# Patient Record
Sex: MALE | Race: WHITE | Employment: FULL TIME | URBAN - METROPOLITAN AREA
[De-identification: names, ages, dates, MRNs, and addresses within clinical notes are randomized per-mention and may not be internally consistent; named-entity substitution may affect disease eponyms.]

---

## 2017-03-20 PROBLEM — N52.9 ERECTILE DYSFUNCTION: Status: ACTIVE | Noted: 2017-03-20

## 2018-01-09 PROBLEM — F33.9 RECURRENT DEPRESSION (HCC): Status: ACTIVE | Noted: 2018-01-09

## 2018-03-21 PROBLEM — S46.111A STRAIN OF MUSCLE, FASCIA AND TENDON OF LONG HEAD OF BICEPS, RIGHT ARM, INITIAL ENCOUNTER: Status: ACTIVE | Noted: 2018-03-21

## 2018-03-21 PROBLEM — S43.431A SUPERIOR GLENOID LABRUM LESION OF RIGHT SHOULDER: Status: ACTIVE | Noted: 2018-03-21

## 2018-03-21 PROBLEM — S46.011A TRAUMATIC TEAR OF RIGHT ROTATOR CUFF: Status: ACTIVE | Noted: 2018-03-21

## 2018-03-21 NOTE — H&P
[facility] HISTORY AND PHYSICAL    Subjective:     Patient is a 65 y.o. RHD MALE WITH RIGHT SHOULDER PAIN. SEE OFFICE NOTE. Patient Active Problem List    Diagnosis Date Noted    Traumatic tear of right rotator cuff 03/21/2018    Superior glenoid labrum lesion of right shoulder 03/21/2018    Strain of muscle, fascia and tendon of long head of biceps, right arm, initial encounter 03/21/2018    Recurrent depression ([address] 01/09/2018    Erectile dysfunction 03/20/2017    Dysosmia 11/02/2015     Past Medical History:   Diagnosis Date    Depression     Elevated PSA       Past Surgical History:   Procedure Laterality Date    HX COLONOSCOPY  11/2015    + polyp, repeat 2018    HX ROTATOR CUFF REPAIR      HX WISDOM TEETH EXTRACTION        Prior to Admission medications    Medication Sig Start Date End Date Taking? Authorizing Provider   sertraline (ZOLOFT) 100 mg tablet Take 1 Tab by mouth daily. 1/31/18   Caridad Valero MD   diclofenac EC (VOLTAREN) 75 mg EC tablet 1 bid for pain 1/9/18   Caridad Valero MD   tadalafil (CIALIS) 20 mg tablet 1/2 to 1  q48 h prn 11/10/17   Kait Molina MD     Allergies   Allergen Reactions    Codeine Other (comments)     Elevated BP    Phenylephrine Other (comments)     Elevated BP      Social History   Substance Use Topics    Smoking status: Never Smoker    Smokeless tobacco: Never Used    Alcohol use Not on file      Family History   Problem Relation Age of Onset    Heart Disease Father       Review of Systems  A comprehensive review of systems was negative except for that written in the HPI. Objective:     No data found. There were no vitals taken for this visit. General:  Alert, cooperative, no distress, appears stated age. Head:  Normocephalic, without obvious abnormality, atraumatic. Back:   Symmetric, no curvature. ROM normal. No CVA tenderness. Lungs:   Clear to auscultation bilaterally.    Chest wall: No tenderness or deformity. Heart:  Regular rate and rhythm, S1, S2 normal, no murmur, click, rub or gallop. Extremities: Extremities normal, atraumatic, no cyanosis or edema. Pulses: 2+ and symmetric all extremities. Skin: Skin color, texture, turgor normal. No rashes or lesions   Lymph nodes: Cervical, supraclavicular, and axillary nodes normal.   Neurologic: CNII-XII intact. Normal strength, sensation and reflexes throughout. Assessment:     Principal Problem:    Traumatic tear of right rotator cuff (3/21/2018)    Active Problems:    Superior glenoid labrum lesion of right shoulder (3/21/2018)      Strain of muscle, fascia and tendon of long head of biceps, right arm, initial encounter (3/21/2018)        Plan:     The various methods of treatment have been discussed with the patient and family. PATIENT HAS EXHAUSTED NON-OPERATIVE MODALITIES     After consideration of risks, benefits and other options for treatment, the patient has consented to surgical intervention.     SEE OFFICE NOTE    Vonda Crandall MD

## 2018-03-29 ENCOUNTER — HOSPITAL ENCOUNTER (OUTPATIENT)
Dept: SURGERY | Age: 58
Discharge: HOME | End: 2018-03-29

## 2018-03-30 VITALS — HEIGHT: 67 IN | WEIGHT: 172 LBS | BODY MASS INDEX: 27 KG/M2

## 2018-03-30 RX ORDER — GLUCOSAMINE/CHONDR SU A SOD 750-600 MG
1 TABLET ORAL DAILY
COMMUNITY

## 2018-03-30 RX ORDER — SAW PALMETTO 160 MG
1 CAPSULE ORAL DAILY
COMMUNITY

## 2018-03-30 NOTE — PERIOP NOTES
Patient verified name, , and surgery as listed in [facility]. Type 1B surgery, PAT phone assessment complete. Orders were received. Labs per surgeon: HgbA1C and FBS DOS. Labs per anesthesia protocol: none. Patient answered medical/surgical history questions at their best of ability. All prior to admission medications documented in [facility]. Patient instructed to take the following medications the day of surgery according to anesthesia guidelines with a small sip of water: zoloft. Hold all vitamins 7 days prior to surgery and NSAIDS 5 days prior to surgery. Medications to be held -vitamins and supplements. Patient instructed on the following:  Arrive at A Entrance, time of arrival to be called the day before by 1700  NPO after midnight including gum, mints, and ice chips  Responsible adult must drive patient to the hospital, stay during surgery, and patient will  need supervision 24 hours after anesthesia  Use antibacterial soap in shower the night before surgery and on the morning of surgery  Leave all valuables (money and jewelry) at home but bring insurance card and ID on  DOS  Do not wear make-up, nail polish, lotions, cologne, perfumes, powders, or oil on skin. Patient teach back successful and patient demonstrates knowledge of instruction.

## 2018-04-02 NOTE — BRIEF OP NOTE
BRIEF OPERATIVE NOTE    Date of Procedure: 4/5/2018     Preoperative Diagnosis:  ROTATOR CUFF TEAR RIGHT SHOULDER      BICEPITAL STRAIN RIGHT SHOULDER      SLAP TEAR RIGHT SHOULDER      AC OA RIGHT SHOULDER    Postoperative Diagnosis:  SAME    Procedure(s): ARTHROSCOPY RIGHT SHOULDER REVISION ARTHROSCOPIC SUBACROMIAL DECOMPRESSION, REVISION DISTAL CLAVICLE RESECTION, EXTENSIVE DEBRIDEMENT SLAP TEAR, GLENOHUMERAL JOINT, SUBACROMIAL SPACE, MINI OPEN ROTATOR CUFF REPAIR, BICEPS TENODESIS    Surgeon(s) and Role:     * Ricky Carrillo MD - Primary         Assistant Staff: None      Surgical Staff:  * No surgical staff found *  No case tracking events are documented in the log. Anesthesia:  GENERAL WITH INTERSCALENE BLOCK    Estimated Blood Loss: 10 CC. Complications: NONE    Implants:     Implant Name Type Inv.  Item Serial No.  Lot No. LRB No. Used Action   ANCHOR SUT 5.5MM W/NDL PEEK ZP --  - [id number]  ANCHOR SUT 5.5MM W/NDL PEEK ZP --  [id number] [employer] [id number] Right 2 Implanted   ANCHOR SUT 5.5MM W/NDL PEEK ZP --  - [id number]  ANCHOR SUT 5.5MM W/NDL PEEK ZP --  [id number] [employer] [id number] Right 1 Implanted   ANCHOR SUT 5.5MM W/NDL PEEK ZP --  - [id number]  ANCHOR SUT 5.5MM W/NDL PEEK ZP --  [id number] [employer] [id number] Right 2 Implanted   ANCHOR SUT 2.3MM W/2.0MM BRAID --  - [id number]   ANCHOR SUT 2.3MM W/2.0MM BRAID --  [id number] [employer] [id number] Right 3 Implanted       Ricky Carrillo MD

## 2018-04-04 ENCOUNTER — ANESTHESIA EVENT (OUTPATIENT)
Dept: SURGERY | Age: 58
End: 2018-04-04
Payer: COMMERCIAL

## 2018-04-04 RX ORDER — MIDAZOLAM HYDROCHLORIDE 1 MG/ML
2 INJECTION, SOLUTION INTRAMUSCULAR; INTRAVENOUS ONCE
Status: CANCELLED | OUTPATIENT
Start: 2018-04-04 | End: 2018-04-04

## 2018-04-04 RX ORDER — SODIUM CHLORIDE 0.9 % (FLUSH) 0.9 %
5-10 SYRINGE (ML) INJECTION EVERY 8 HOURS
Status: CANCELLED | OUTPATIENT
Start: 2018-04-04

## 2018-04-04 RX ORDER — SODIUM CHLORIDE 0.9 % (FLUSH) 0.9 %
5-10 SYRINGE (ML) INJECTION AS NEEDED
Status: CANCELLED | OUTPATIENT
Start: 2018-04-04

## 2018-04-05 ENCOUNTER — APPOINTMENT (OUTPATIENT)
Dept: GENERAL RADIOLOGY | Age: 58
End: 2018-04-05
Attending: ORTHOPAEDIC SURGERY
Payer: COMMERCIAL

## 2018-04-05 ENCOUNTER — HOSPITAL ENCOUNTER (OUTPATIENT)
Age: 58
Setting detail: OUTPATIENT SURGERY
Discharge: HOME | End: 2018-04-05
Attending: ORTHOPAEDIC SURGERY | Admitting: ORTHOPAEDIC SURGERY
Payer: COMMERCIAL

## 2018-04-05 ENCOUNTER — ANESTHESIA (OUTPATIENT)
Dept: SURGERY | Age: 58
End: 2018-04-05
Payer: COMMERCIAL

## 2018-04-05 VITALS
DIASTOLIC BLOOD PRESSURE: 81 MMHG | BODY MASS INDEX: 26.85 KG/M2 | SYSTOLIC BLOOD PRESSURE: 137 MMHG | OXYGEN SATURATION: 97 % | HEIGHT: 67 IN | TEMPERATURE: 98 F | WEIGHT: 171.1 LBS | HEART RATE: 82 BPM | RESPIRATION RATE: 16 BRPM

## 2018-04-05 PROBLEM — M19.011 DEGENERATIVE JOINT DISEASE OF RIGHT ACROMIOCLAVICULAR JOINT: Status: ACTIVE | Noted: 2018-04-05

## 2018-04-05 LAB
EST. AVERAGE GLUCOSE BLD GHB EST-MCNC: 103 MG/DL
GLUCOSE BLD STRIP.AUTO-MCNC: 99 MG/DL (ref 65–100)
HBA1C MFR BLD: 5.2 % (ref 4.8–6)

## 2018-04-05 PROCEDURE — 77030003602 HC NDL NRV BLK BBMI -B: Performed by: ANESTHESIOLOGY

## 2018-04-05 PROCEDURE — 76210000006 HC OR PH I REC 0.5 TO 1 HR: Performed by: ORTHOPAEDIC SURGERY

## 2018-04-05 PROCEDURE — 74011250636 HC RX REV CODE- 250/636: Performed by: ORTHOPAEDIC SURGERY

## 2018-04-05 PROCEDURE — 74011250636 HC RX REV CODE- 250/636: Performed by: ANESTHESIOLOGY

## 2018-04-05 PROCEDURE — 74011250636 HC RX REV CODE- 250/636

## 2018-04-05 PROCEDURE — 77030033073 HC TBNG ARTHSC PMP OUTFLO STRY -B: Performed by: ORTHOPAEDIC SURGERY

## 2018-04-05 PROCEDURE — 83036 HEMOGLOBIN GLYCOSYLATED A1C: CPT | Performed by: ORTHOPAEDIC SURGERY

## 2018-04-05 PROCEDURE — 82962 GLUCOSE BLOOD TEST: CPT

## 2018-04-05 PROCEDURE — 77030004453 HC BUR SHV STRY -B: Performed by: ORTHOPAEDIC SURGERY

## 2018-04-05 PROCEDURE — 77030031139 HC SUT VCRL2 J&J -A: Performed by: ORTHOPAEDIC SURGERY

## 2018-04-05 PROCEDURE — 77030018836 HC SOL IRR NACL ICUM -A: Performed by: ORTHOPAEDIC SURGERY

## 2018-04-05 PROCEDURE — 77030020782 HC GWN BAIR PAWS FLX 3M -B: Performed by: ANESTHESIOLOGY

## 2018-04-05 PROCEDURE — 76010000162 HC OR TIME 1.5 TO 2 HR INTENSV-TIER 1: Performed by: ORTHOPAEDIC SURGERY

## 2018-04-05 PROCEDURE — 76060000034 HC ANESTHESIA 1.5 TO 2 HR: Performed by: ORTHOPAEDIC SURGERY

## 2018-04-05 PROCEDURE — A4565 SLINGS: HCPCS | Performed by: ORTHOPAEDIC SURGERY

## 2018-04-05 PROCEDURE — 77030020143 HC AIRWY LARYN INTUB CGAS -A: Performed by: ANESTHESIOLOGY

## 2018-04-05 PROCEDURE — 77030018986 HC SUT ETHBND4 J&J -B: Performed by: ORTHOPAEDIC SURGERY

## 2018-04-05 PROCEDURE — 74011000250 HC RX REV CODE- 250

## 2018-04-05 PROCEDURE — 74011000250 HC RX REV CODE- 250: Performed by: ORTHOPAEDIC SURGERY

## 2018-04-05 PROCEDURE — 76942 ECHO GUIDE FOR BIOPSY: CPT | Performed by: ORTHOPAEDIC SURGERY

## 2018-04-05 PROCEDURE — 77030002986 HC SUT PROL J&J -A: Performed by: ORTHOPAEDIC SURGERY

## 2018-04-05 PROCEDURE — C1713 ANCHOR/SCREW BN/BN,TIS/BN: HCPCS | Performed by: ORTHOPAEDIC SURGERY

## 2018-04-05 PROCEDURE — 77030006891 HC BLD SHV RESECT STRY -B: Performed by: ORTHOPAEDIC SURGERY

## 2018-04-05 PROCEDURE — 77030003666 HC NDL SPINAL BD -A: Performed by: ORTHOPAEDIC SURGERY

## 2018-04-05 PROCEDURE — 76010010054 HC POST OP PAIN BLOCK: Performed by: ORTHOPAEDIC SURGERY

## 2018-04-05 PROCEDURE — 73030 X-RAY EXAM OF SHOULDER: CPT

## 2018-04-05 PROCEDURE — 74011000250 HC RX REV CODE- 250: Performed by: ANESTHESIOLOGY

## 2018-04-05 PROCEDURE — 77030006668 HC BLD SHV MENSCS STRY -B: Performed by: ORTHOPAEDIC SURGERY

## 2018-04-05 DEVICE — ICONIX 2 NEEDLES WITH INTELLIBRAID TECHNOLOGY, 2.3MM ANCHOR WITH 2 STRANDS #2 FORCE FIBER
Type: IMPLANTABLE DEVICE | Site: SHOULDER | Status: FUNCTIONAL
Brand: ICONIX

## 2018-04-05 DEVICE — 5.5MM PEEK ZIP SUTURE ANCHOR WITH ¿ CIRCLE TAPER NEEDLES, #2 FORCE FIBER
Type: IMPLANTABLE DEVICE | Site: SHOULDER | Status: FUNCTIONAL
Brand: PEEK ZIP

## 2018-04-05 RX ORDER — LIDOCAINE HYDROCHLORIDE AND EPINEPHRINE 10; 10 MG/ML; UG/ML
INJECTION, SOLUTION INFILTRATION; PERINEURAL AS NEEDED
Status: DISCONTINUED | OUTPATIENT
Start: 2018-04-05 | End: 2018-04-05 | Stop reason: HOSPADM

## 2018-04-05 RX ORDER — SODIUM CHLORIDE, SODIUM LACTATE, POTASSIUM CHLORIDE, CALCIUM CHLORIDE 600; 310; 30; 20 MG/100ML; MG/100ML; MG/100ML; MG/100ML
100 INJECTION, SOLUTION INTRAVENOUS CONTINUOUS
Status: DISCONTINUED | OUTPATIENT
Start: 2018-04-05 | End: 2018-04-05 | Stop reason: HOSPADM

## 2018-04-05 RX ORDER — DEXAMETHASONE SODIUM PHOSPHATE 4 MG/ML
INJECTION, SOLUTION INTRA-ARTICULAR; INTRALESIONAL; INTRAMUSCULAR; INTRAVENOUS; SOFT TISSUE AS NEEDED
Status: DISCONTINUED | OUTPATIENT
Start: 2018-04-05 | End: 2018-04-05 | Stop reason: HOSPADM

## 2018-04-05 RX ORDER — SODIUM CHLORIDE 0.9 % (FLUSH) 0.9 %
5-10 SYRINGE (ML) INJECTION AS NEEDED
Status: DISCONTINUED | OUTPATIENT
Start: 2018-04-05 | End: 2018-04-05 | Stop reason: HOSPADM

## 2018-04-05 RX ORDER — OXYCODONE HYDROCHLORIDE 5 MG/1
10 TABLET ORAL
Status: DISCONTINUED | OUTPATIENT
Start: 2018-04-05 | End: 2018-04-05 | Stop reason: HOSPADM

## 2018-04-05 RX ORDER — HYDROMORPHONE HYDROCHLORIDE 2 MG/ML
0.5 INJECTION, SOLUTION INTRAMUSCULAR; INTRAVENOUS; SUBCUTANEOUS
Status: DISCONTINUED | OUTPATIENT
Start: 2018-04-05 | End: 2018-04-05 | Stop reason: HOSPADM

## 2018-04-05 RX ORDER — ONDANSETRON 2 MG/ML
INJECTION INTRAMUSCULAR; INTRAVENOUS AS NEEDED
Status: DISCONTINUED | OUTPATIENT
Start: 2018-04-05 | End: 2018-04-05 | Stop reason: HOSPADM

## 2018-04-05 RX ORDER — ACETAMINOPHEN 500 MG
1000 TABLET ORAL ONCE
Status: DISCONTINUED | OUTPATIENT
Start: 2018-04-05 | End: 2018-04-05 | Stop reason: HOSPADM

## 2018-04-05 RX ORDER — GLYCOPYRROLATE 0.2 MG/ML
INJECTION INTRAMUSCULAR; INTRAVENOUS AS NEEDED
Status: DISCONTINUED | OUTPATIENT
Start: 2018-04-05 | End: 2018-04-05 | Stop reason: HOSPADM

## 2018-04-05 RX ORDER — ROPIVACAINE HYDROCHLORIDE 5 MG/ML
INJECTION, SOLUTION EPIDURAL; INFILTRATION; PERINEURAL AS NEEDED
Status: DISCONTINUED | OUTPATIENT
Start: 2018-04-05 | End: 2018-04-05 | Stop reason: HOSPADM

## 2018-04-05 RX ORDER — DEXAMETHASONE SODIUM PHOSPHATE 100 MG/10ML
INJECTION INTRAMUSCULAR; INTRAVENOUS AS NEEDED
Status: DISCONTINUED | OUTPATIENT
Start: 2018-04-05 | End: 2018-04-05 | Stop reason: HOSPADM

## 2018-04-05 RX ORDER — FLUMAZENIL 0.1 MG/ML
0.2 INJECTION INTRAVENOUS
Status: DISCONTINUED | OUTPATIENT
Start: 2018-04-05 | End: 2018-04-05 | Stop reason: HOSPADM

## 2018-04-05 RX ORDER — PROPOFOL 10 MG/ML
INJECTION, EMULSION INTRAVENOUS AS NEEDED
Status: DISCONTINUED | OUTPATIENT
Start: 2018-04-05 | End: 2018-04-05 | Stop reason: HOSPADM

## 2018-04-05 RX ORDER — NALOXONE HYDROCHLORIDE 0.4 MG/ML
0.2 INJECTION, SOLUTION INTRAMUSCULAR; INTRAVENOUS; SUBCUTANEOUS AS NEEDED
Status: DISCONTINUED | OUTPATIENT
Start: 2018-04-05 | End: 2018-04-05 | Stop reason: HOSPADM

## 2018-04-05 RX ORDER — LIDOCAINE HYDROCHLORIDE 20 MG/ML
INJECTION, SOLUTION EPIDURAL; INFILTRATION; INTRACAUDAL; PERINEURAL AS NEEDED
Status: DISCONTINUED | OUTPATIENT
Start: 2018-04-05 | End: 2018-04-05 | Stop reason: HOSPADM

## 2018-04-05 RX ORDER — MIDAZOLAM HYDROCHLORIDE 1 MG/ML
2 INJECTION, SOLUTION INTRAMUSCULAR; INTRAVENOUS
Status: COMPLETED | OUTPATIENT
Start: 2018-04-05 | End: 2018-04-05

## 2018-04-05 RX ORDER — FENTANYL CITRATE 50 UG/ML
100 INJECTION, SOLUTION INTRAMUSCULAR; INTRAVENOUS ONCE
Status: COMPLETED | OUTPATIENT
Start: 2018-04-05 | End: 2018-04-05

## 2018-04-05 RX ORDER — DIPHENHYDRAMINE HYDROCHLORIDE 50 MG/ML
12.5 INJECTION, SOLUTION INTRAMUSCULAR; INTRAVENOUS
Status: DISCONTINUED | OUTPATIENT
Start: 2018-04-05 | End: 2018-04-05 | Stop reason: HOSPADM

## 2018-04-05 RX ORDER — CEFAZOLIN SODIUM/WATER 2 G/20 ML
2 SYRINGE (ML) INTRAVENOUS ONCE
Status: COMPLETED | OUTPATIENT
Start: 2018-04-05 | End: 2018-04-05

## 2018-04-05 RX ORDER — LIDOCAINE HYDROCHLORIDE 10 MG/ML
0.1 INJECTION INFILTRATION; PERINEURAL AS NEEDED
Status: DISCONTINUED | OUTPATIENT
Start: 2018-04-05 | End: 2018-04-05 | Stop reason: HOSPADM

## 2018-04-05 RX ORDER — OXYCODONE HYDROCHLORIDE 5 MG/1
5 TABLET ORAL
Status: DISCONTINUED | OUTPATIENT
Start: 2018-04-05 | End: 2018-04-05 | Stop reason: HOSPADM

## 2018-04-05 RX ADMIN — LIDOCAINE HYDROCHLORIDE 70 MG: 20 INJECTION, SOLUTION EPIDURAL; INFILTRATION; INTRACAUDAL; PERINEURAL at 16:22

## 2018-04-05 RX ADMIN — PROPOFOL 200 MG: 10 INJECTION, EMULSION INTRAVENOUS at 16:22

## 2018-04-05 RX ADMIN — DEXAMETHASONE SODIUM PHOSPHATE 10 MG: 4 INJECTION, SOLUTION INTRA-ARTICULAR; INTRALESIONAL; INTRAMUSCULAR; INTRAVENOUS; SOFT TISSUE at 16:33

## 2018-04-05 RX ADMIN — SODIUM CHLORIDE, SODIUM LACTATE, POTASSIUM CHLORIDE, AND CALCIUM CHLORIDE: 600; 310; 30; 20 INJECTION, SOLUTION INTRAVENOUS at 17:06

## 2018-04-05 RX ADMIN — GLYCOPYRROLATE 0.2 MG: 0.2 INJECTION INTRAMUSCULAR; INTRAVENOUS at 16:43

## 2018-04-05 RX ADMIN — ONDANSETRON 4 MG: 2 INJECTION INTRAMUSCULAR; INTRAVENOUS at 16:33

## 2018-04-05 RX ADMIN — MIDAZOLAM HYDROCHLORIDE 2 MG: 1 INJECTION, SOLUTION INTRAMUSCULAR; INTRAVENOUS at 14:47

## 2018-04-05 RX ADMIN — LIDOCAINE HYDROCHLORIDE 0.1 ML: 10 INJECTION, SOLUTION INFILTRATION; PERINEURAL at 11:57

## 2018-04-05 RX ADMIN — FENTANYL CITRATE 100 MCG: 50 INJECTION INTRAMUSCULAR; INTRAVENOUS at 14:47

## 2018-04-05 RX ADMIN — ROPIVACAINE HYDROCHLORIDE 30 ML: 5 INJECTION, SOLUTION EPIDURAL; INFILTRATION; PERINEURAL at 14:54

## 2018-04-05 RX ADMIN — Medication 2 G: at 16:20

## 2018-04-05 RX ADMIN — DEXAMETHASONE SODIUM PHOSPHATE 5 MG: 100 INJECTION INTRAMUSCULAR; INTRAVENOUS at 14:54

## 2018-04-05 RX ADMIN — SODIUM CHLORIDE, SODIUM LACTATE, POTASSIUM CHLORIDE, AND CALCIUM CHLORIDE 100 ML/HR: 600; 310; 30; 20 INJECTION, SOLUTION INTRAVENOUS at 11:57

## 2018-04-05 NOTE — IP AVS SNAPSHOT
[address] [place] [address] 
[phone] Patient: Javy Medel MRN: [id number] OHD:8[birth year]44[birth year] About your hospitalization You were admitted on:  April 5, 2018 You last received care in the:  [facility] PACU You were discharged on:  April 5, 2018 Why you were hospitalized Your primary diagnosis was:  Traumatic Tear Of Right Rotator Cuff Your diagnoses also included:  Superior Glenoid Labrum Lesion Of Right Shoulder, Strain Of Muscle, Fascia And Tendon Of Long Head Of Biceps, Right Arm, Initial Encounter, Degenerative Joint Disease Of Right Acromioclavicular Joint Follow-up Information Follow up With Details Comments Contact Info Yajaira Dan MD   [address] [place] [address] 
[phone] Val Yo MD  Keep scheduled appointment [address] [facility]  
[address] [facility] [address] [place] [address] [place] [address] Your Scheduled Appointments Tuesday April 17, 2018  8:15 AM EDT  
BLOOD DRAW with [facility] ([facility]) [address] [facility] [address]  
[phone] Tuesday April 24, 2018  2:45 PM EDT Office Visit with Sanju Lugo MD  
[facility] ([facility] [address]) [address] [facility] [address]  
[phone] Discharge Orders None A check jose indicates which time of day the medication should be taken. My Medications ASK your doctor about these medications Instructions Each Dose to Equal  
 Morning Noon Evening Bedtime ADVANCED VONNIE C PO Your last dose was: Your next dose is: Take 1 Tab by mouth daily. 1 Tab Biotin 2,500 mcg Cap Your last dose was: Your next dose is: Take 1 Tab by mouth daily. 1 Tab diclofenac EC 75 mg EC tablet Commonly known as:  VOLTAREN Your last dose was: Your next dose is:    
   
   
 1 bid for pain Krill-OM3-DHA-EPA-OM6-Lip-Astx 1000-130(40-80) mg Cap Your last dose was: Your next dose is: Take 1 Tab by mouth daily. 1 Tab OTHER(NON-FORMULARY) Your last dose was: Your next dose is: Take 1 Tab by mouth daily. [place] super foods 1 Tab  
    
   
   
   
  
 saw palmetto 160 mg Cap Your last dose was: Your next dose is: Take 1 Tab by mouth daily. 1 Tab  
    
   
   
   
  
 sertraline 100 mg tablet Commonly known as:  ZOLOFT Your last dose was: Your next dose is: Take 1 Tab by mouth daily. 100 mg  
    
   
   
   
  
 tadalafil 20 mg tablet Commonly known as:  CIALIS Your last dose was: Your next dose is:    
   
   
 1/2 to 1  q48 h prn Discharge Instructions INSTRUCTIONS FOLLOWING ARTHROSCOPY SURGERY Dr. Courtney Castelan [phone] ACTIVITY  As tolerated and as directed by your doctor  Elevate surgery site first 48 hours.  Use arm sling or crutches per your doctors instructions.  Bathe or shower as directed by your doctor. DIET  Clear liquids until no nausea or vomiting; then light diet for the first day  Advance to regular diet on second day, unless your doctor orders otherwise.  If nausea and vomiting continues, call your doctor. PAIN 
 Take pain medication as directed by your doctor.  Call your doctor if pain is NOT relieved by medication.  DO NOT take aspirin or blood thinners until directed by your doctor. DRESSING CARE: Follow all dressing care instructions provided by Dr. Courtney Castelan FOLLOW-UP PHONE CALLS  Calls will be made by nursing staff.  If you have any problems or concerns, call your doctor as needed. CALL YOUR DOCTOR IF 
 Excessive bleeding that does not stop after holding mild pressure over the area  Temperature of 101°F or above  Redness, excessive swelling or bruising, and/or green or yellow, smelly discharge from incision AFTER ANESTHESIA  For the next 24 hours: DO NOT Drive, Drink alcoholic beverages, or Make important decisions.  Be aware of dizziness following anesthesia and while taking pain medication. MEDICATIONS: 
Continue home medications as previously prescribed with the following changes: {None:87004::\"none\"}. Cryo Cuff or Iceman 24-48 hours continuously Introducing [address]! [facility] [employer] introduces [employer] patient portal. Now you can access parts of your medical record, email your doctor's office, and request medication refills online. 1. In your internet browser, go to [email or web address] 2. Click on the First Time User? Click Here link in the Sign In box. You will see the New Member Sign Up page. 3. Enter your [employer] Access Code exactly as it appears below. You will not need to use this code after youve completed the sign-up process. If you do not sign up before the expiration date, you must request a new code. · [employer] Access Code: [email or web address] Expires: 5/15/2018 11:20 AM 
 
4. Enter the last four digits of your Social Security Number (xxxx) and Date of Birth (mm/dd/yyyy) as indicated and click Submit. You will be taken to the next sign-up page. 5. Create a [employer] ID. This will be your [employer] login ID and cannot be changed, so think of one that is secure and easy to remember. 6. Create a [employer] password. You can change your password at any time. 7. Enter your Password Reset Question and Answer. This can be used at a later time if you forget your password. 8. Enter your e-mail address. You will receive e-mail notification when new information is available in [address] 9. Click Sign Up. You can now view and download portions of your medical record. 10. Click the Download Summary menu link to download a portable copy of your medical information. If you have questions, please visit the Frequently Asked Questions section of the [employer] website. Remember, Ogone is NOT to be used for urgent needs. For medical emergencies, dial 911. Now available from your iPhone and Android! Introducing Song Zavala As a [employer] patient, I wanted to make you aware of our electronic visit tool called Song Zavala. netFactor 24/7 allows you to connect within minutes with a medical provider 24 hours a day, seven days a week via a mobile device or tablet or logging into a secure website from your computer. You can access Song Zavala from anywhere in the [place]. A virtual visit might be right for you when you have a simple condition and feel like you just dont want to get out of bed, or cant get away from work for an appointment, when your regular [employer] provider is not available (evenings, weekends or holidays), or when youre out of town and need minor care. Electronic visits cost only $49 and if the netFactor 24/7 provider determines a prescription is needed to treat your condition, one can be electronically transmitted to a nearby pharmacy*. Please take a moment to enroll today if you have not already done so. The enrollment process is free and takes just a few minutes. To enroll, please download the netFactor 24/[employer] everardo to your tablet or phone, or visit [email or web address] to enroll on your computer. And, as an [address] [place] [address] patient with a [employer] account, the results of your visits will be scanned into your electronic medical record and your primary care provider will be able to view the scanned results.    
We urge you to continue to see your regular [employer] provider for your ongoing medical care. And while your primary care provider may not be the one available when you seek a Song Zavala virtual visit, the peace of mind you get from getting a real diagnosis real time can be priceless. For more information on Song Zavala, view our Frequently Asked Questions (FAQs) at [email or web address]. Sincerely, 
 
Khris Mora MD 
Chief Medical Officer Job Mena *:  certain medications cannot be prescribed via Song Zavala Unresulted Labs-Please follow up with your PCP about these lab tests Order Current Status XR SHOULDER RT AP/LAT MIN 2 V In process Providers Seen During Your Hospitalization Provider Specialty Primary office phone Dillon Magana MD Orthopedic Surgery [phone] Your Primary Care Physician (PCP) Primary Care Physician Office Phone Office Fax Perri Ratliff [phone] You are allergic to the following Allergen Reactions Codeine Other (comments) Elevated BP Phenylephrine Other (comments) Elevated BP Recent Documentation Height Weight BMI Smoking Status 1.702 m 77.6 kg 26.8 kg/m2 Former Smoker Emergency Contacts Name Discharge Info Relation Home Work Mobile Destini Cunningham DISCHARGE CAREGIVER [3] Girlfriend [18]   [phone] [address]  Brother [24]   [phone] Patient Belongings The following personal items are in your possession at time of discharge: 
  Dental Appliances: None  Visual Aid: Glasses      Home Medications: None   Jewelry: None  Clothing: Shirt, Pants, Jacket/Coat, Footwear, Undergarments    Other Valuables: Cell Phone, Jammie Ramos Please provide this summary of care documentation to your next provider. Signatures-by signing, you are acknowledging that this After Visit Summary has been reviewed with you and you have received a copy. Patient Signature:  ____________________________________________________________ Date:  ____________________________________________________________  
  
Audrey Piety Provider Signature:  ____________________________________________________________ Date:  ____________________________________________________________

## 2018-04-05 NOTE — ANESTHESIA PROCEDURE NOTES
Peripheral Block    Start time: 4/5/2018 2:47 PM  End time: 4/5/2018 2:54 PM  Performed by: Mony Reyes  Authorized by: Mony Reyes       Pre-procedure: Indications: at surgeon's request and post-op pain management    Preanesthetic Checklist: patient identified, risks and benefits discussed, site marked, timeout performed, anesthesia consent given and patient being monitored      Block Type:   Block Type: Interscalene  Laterality:  Right  Monitoring:  Continuous pulse ox, frequent vital sign checks, heart rate, responsive to questions and oxygen  Injection Technique:  Single shot  Procedures: ultrasound guided    Prep: chlorhexidine    Location:  Interscalene  Needle Type:  Stimuplex  Needle Gauge:  22 G  Needle Localization:  Anatomical landmarks, infiltration and ultrasound guidance  Medication Injected:  0.5%  ropivacaine  Adds:  Epi 1:400K  Volume (mL):  30    Assessment:  Number of attempts:  1  Injection Assessment:  Incremental injection every 5 mL, negative aspiration for CSF, local visualized surrounding nerve on ultrasound, negative aspiration for blood, no intravascular symptoms, no paresthesia and ultrasound image on chart  Patient tolerance:  Patient tolerated the procedure well with no immediate complications  5 mg decadron. Heme aspirated during block. Needle withdrawn and cleared.   No further issues

## 2018-04-05 NOTE — ANESTHESIA POSTPROCEDURE EVALUATION
Post-Anesthesia Evaluation and Assessment    Patient: Jannie Valerio MRN: [id number]  SSN: [id number]    YOB: 1960  Age: 65 y.o. Sex: male       Cardiovascular Function/Vital Signs  Visit Vitals    /64 (BP 1 Location: Left arm, BP Patient Position: At rest)    Pulse 82    Temp 36.7 °C (98 °F)    Resp 16    Ht 5' 7\" (1.702 m)    Wt 77.6 kg (171 lb 1.6 oz)    SpO2 94%    BMI 26.8 kg/m2       Patient is status post general, regional anesthesia for Procedure(s):  RIGHT SHOULDER ARTHROSCOPY WITH ARTHROSCOPIC VS MINI  OPEN ROTATOR CUFF REPAIR / BICEPS TENODESIS/  GENERAL AND INTERSCALENE ANES. Nausea/Vomiting: None    Postoperative hydration reviewed and adequate. Pain:  Pain Scale 1: Visual (04/05/18 1754)  Pain Intensity 1: 0 (04/05/18 1754)   Managed    Neurological Status:   Neuro (WDL): Exceptions to WDL (04/05/18 1754)  Neuro  Neurologic State: Drowsy (04/05/18 1754)  Orientation Level: Oriented to person (04/05/18 1754)  Cognition: Follows commands (04/05/18 1754)   At baseline    Mental Status and Level of Consciousness: Arousable    Pulmonary Status:   O2 Device: Room air (04/05/18 1809)   Adequate oxygenation and airway patent    Complications related to anesthesia: None    Post-anesthesia assessment completed.  No concerns    Signed By: Fernando Chi MD     April 5, 2018

## 2018-04-05 NOTE — H&P
Date of Surgery Update:  Miriam Mcfarland was seen and examined. History and physical has been reviewed. The patient has been examined.  There have been no significant clinical changes since the completion of the originally dated History and Physical.    Signed By: Keesha Tan MD     April 5, 2018 10:34 AM

## 2018-04-05 NOTE — PROGRESS NOTES
Spiritual Care visit. Initial Visit, Pre Surgery Consult. Visit and prayer before patient goes to surgery.     Visit by Leslie Weiner M.Ed., Th.B. ,Staff

## 2018-04-05 NOTE — ANESTHESIA PREPROCEDURE EVALUATION
Anesthetic History   No history of anesthetic complications            Review of Systems / Medical History  Patient summary reviewed and pertinent labs reviewed    Pulmonary  Within defined limits                 Neuro/Psych         Psychiatric history     Cardiovascular                  Exercise tolerance: >4 METS     GI/Hepatic/Renal  Within defined limits              Endo/Other  Within defined limits           Other Findings              Physical Exam    Airway  Mallampati: II  TM Distance: 4 - 6 cm  Neck ROM: normal range of motion   Mouth opening: Normal     Cardiovascular    Rhythm: regular  Rate: normal         Dental         Pulmonary  Breath sounds clear to auscultation               Abdominal         Other Findings            Anesthetic Plan    ASA: 2  Anesthesia type: general      Post-op pain plan if not by surgeon: peripheral nerve block single    Induction: Intravenous  Anesthetic plan and risks discussed with: Patient and Spouse

## 2018-04-06 NOTE — OP NOTES
[address] [facility]  OPERATIVE REPORT    Rosy Arriola  MR#: [id number]  : 1960  ACCOUNT #: [de-identified]   DATE OF SERVICE: 2018    PREOPERATIVE DIAGNOSES:  1. Rotator cuff tear, right shoulder. 2.  Bicipital strain, right shoulder. 3. SLAP tear, right shoulder. 4.  AC arthritis, right shoulder. POSTOPERATIVE DIAGNOSES:  1. Rotator cuff tear, right shoulder. 2.  Bicipital strain, right shoulder. 3. SLAP tear, right shoulder. 4.  AC arthritis, right shoulder. PROCEDURE:    1. Arthroscopy, right shoulder. 2.  Revision arthroscopic subacromial decompression. 3.  Revision arthroscopic distal clavicle resection. 4.  Extensive debridement, SLAP tear glenohumeral joint and subacromial space. 5.  Mini open rotator cuff repair, and biceps tenodesis. OPERATIVE SURGEON:  Austin Hughes MD    ASSISTANT:  None. PATHOLOGY:  1. Type 1 acromion with hypertrophied CA ligament. 2.  AC joint arthritis with a spur superiorly. 3.  Bicipital strain. 4.  Type 2 SLAP tear. 5.  A 3 cm complex supra and infraspinatus rotator cuff tear. CPT codes are 63200 with a modifier 22 for revision procedure, 48275 with a modifier 22 for revision procedure, [id number], [id number], and 64204. ICD-10 codes are S46.011, S46.111, S43.431, M19.011. HARDWARE UTILIZED:  Five Jefferson 5.5 anchors and 3 Iconix 2.3 anchors. INDICATIONS:  The patient is a 76-year-old gentleman who is now over 10 years status post previous arthroscopy of the right shoulder ASADCR. The patient has done well. About 8 months ago, he fell injuring his right shoulder. Preoperative physical exam, radiographs and an MR demonstrated a type 1 acromion. He is status post an [facility] joint resection, but there is some spurring superiorly. He has a full thickness complex rotator cuff tear involving supra and infraspinatus, bicipital strain, and a SLAP tear.  The patient has exhausted nonoperative modalities and now electively and admitted for operative intervention. ANESTHESIA:  General with interscalene block. BLOOD LOSS:  10 mL. COMPLICATIONS:  None. PROCEDURE:  Following identification of the patient, the patient was taken to the operative suite. Following administration of general anesthesia, interscalene block for postop pain control, 2 grams of IV Ancef, a measurement of a hemoglobin A1c and fasting blood glucose of 5.2 and 99 respectively, normal, the patient was then positioned on the operating table in the supine fashion. Right shoulder was examined under anesthesia and noted to be stable through full range of motion. At this point, the patient was carefully positioned in the lateral decubitus position, left side down. Axillary roll was placed. Beanbag was inflated. Care was taken to pad both dependent lower and upper extremities. Right arm was placed in the Technimotion traction device in 15 pounds of traction. Right shoulder was then prepped and draped in a sterile fashion. Subacromial space was then injected with 10 mL of 1% Xylocaine with epinephrine. Scope was introduced into the shoulder. Diagnostic arthroscopy was then commenced. Articular surface of the humeral head and glenoid were visualized and noted to be intact. Anterior, posterior, superior and inferior labrum were visualized. There was a type 2 SLAP tear superiorly with an unstable biceps anchor. The biceps itself was markedly erythematous. There is a full thickness complex 3 cm supra and infraspinatus rotator cuff tear. Subscapularis was intact, and teres minor was intact. Scope was then flip-flopped from the posterior to anterior portal.  Posterior cuff and labrum were visualized. The 3 cm cuff tear was confirmed. Scope was introduced into the subacromial space. Lateral portal was established. Hypertrophic hemorrhagic bursal tissue was then resected. There was significant scar tissue. There was a hypertrophied CA ligament that was impinging on the cuff. At this point, using an Oratec wand and acromionizing bur, an arthroscopic revision subacromial decompression was then performed. This was taken down to the level of the deltoid fascia anteriorly, AC joint posteriorly, contoured from medial to lateral.  Once this was complete, our attention was then turned to revising the distal clavicle resection and remove the superior spur. With use of an Oratec wand, a shaver and a bur, revision arthroscopic distal clavicle resection was then performed resulting in a total resection of the distal clavicle of roughly 10 mm. At this point, with the procedure complete, arthroscopic equipment was removed from the shoulder. It was elected then to perform a mini open approach. The lateral portal was extended 3 cm. Deltoid split was carried out to the acromion. The biceps tendon was identified. It was dissected free. It was markedly tendinopathic. It was dissected free, brought out to length, tacked, transected, and then tenodesed utilizing one 5.5 anchor. At this point, the rotator cuff tear was identified. It was completely mobilized. It was noted to be a 3 cm complex supra and infraspinatus tear. At this point, 4 additional 5.5 anchors and three 2.3 Iconix anchors were utilized to repair the rotator cuff, recreating a double row technique. All limbs of all sutures were passed and secured. This yielded an excellent cuff repair which brought the tissue back to the greater tuberosity. The scope was placed back in the glenohumeral joint. The stump of the biceps tendon and the slap tear were debrided back to stable structures. The anatomic foot print of the rotator cuff was intact. The scope was then placed back into the subacromial space. The bursal side of the cuff was intact. With the procedure complete The portals were reapproximated using 2-0 nylon horizontal mattress sutures.   Lateral wounds closed with 0 Vicryl figure-of-eight sutures and a 2-0 Prolene subcuticular stitch. A sterile dressing, Cryo/Cuff and swathe were applied. The patient was then transferred to the recovery room in a stable condition. Hemoglobin A1c and fasting glucose were 5.2 and 99 respectively.       MD SHARLENE MontezP / LN  D: 04/05/2018 18:01     T: 04/06/2018 08:03  JOB #: [id number]  CC: Komal Dominguez MD

## 2018-04-06 NOTE — DISCHARGE INSTRUCTIONS
INSTRUCTIONS FOLLOWING ARTHROSCOPY SURGERY  Dr. oRbert Cole [phone]    ACTIVITY   As tolerated and as directed by your doctor   Elevate surgery site first 48 hours.  Use arm sling or crutches per your doctors instructions.  Bathe or shower as directed by your doctor. DIET   Clear liquids until no nausea or vomiting; then light diet for the first day   Advance to regular diet on second day, unless your doctor orders otherwise.  If nausea and vomiting continues, call your doctor. PAIN   Take pain medication as directed by your doctor.  Call your doctor if pain is NOT relieved by medication.  DO NOT take aspirin or blood thinners until directed by your doctor. DRESSING CARE: Follow all dressing care instructions provided by Dr. Nimco Knowles will be made by nursing staff.  If you have any problems or concerns, call your doctor as needed. CALL YOUR DOCTOR IF   Excessive bleeding that does not stop after holding mild pressure over the area   Temperature of 101°F or above   Redness, excessive swelling or bruising, and/or green or yellow, smelly discharge from incision    AFTER ANESTHESIA   For the next 24 hours: DO NOT Drive, Drink alcoholic beverages, or Make important decisions.  Be aware of dizziness following anesthesia and while taking pain medication. MEDICATIONS:  Continue home medications as previously prescribed with the following changes: {None:24477::\"none\"}.     Cryo Cuff or Iceman 24-48 hours continuously

## 2018-05-02 PROCEDURE — 88305 TISSUE EXAM BY PATHOLOGIST: CPT | Performed by: UROLOGY

## 2018-05-03 ENCOUNTER — HOSPITAL ENCOUNTER (OUTPATIENT)
Dept: LAB | Age: 58
Discharge: HOME | End: 2018-05-03

## 2018-05-07 NOTE — PROGRESS NOTES
Let patient know no cancer in prostate biopsies. Good news! Keep appointment to discuss in full detail.

## 2022-03-19 PROBLEM — S43.431A SUPERIOR GLENOID LABRUM LESION OF RIGHT SHOULDER: Status: ACTIVE | Noted: 2018-03-21

## 2022-03-19 PROBLEM — F33.9 RECURRENT DEPRESSION (HCC): Status: ACTIVE | Noted: 2018-01-09

## 2022-03-19 PROBLEM — M19.011 DEGENERATIVE JOINT DISEASE OF RIGHT ACROMIOCLAVICULAR JOINT: Status: ACTIVE | Noted: 2018-04-05

## 2022-03-19 PROBLEM — S46.111A STRAIN OF MUSCLE, FASCIA AND TENDON OF LONG HEAD OF BICEPS, RIGHT ARM, INITIAL ENCOUNTER: Status: ACTIVE | Noted: 2018-03-21

## 2022-03-19 PROBLEM — S46.011A TRAUMATIC TEAR OF RIGHT ROTATOR CUFF: Status: ACTIVE | Noted: 2018-03-21

## 2022-03-20 PROBLEM — N52.9 ERECTILE DYSFUNCTION: Status: ACTIVE | Noted: 2017-03-20

## 2023-11-27 ENCOUNTER — HOSPITAL ENCOUNTER (EMERGENCY)
Age: 63
Discharge: HOME | End: 2023-11-27
Attending: EMERGENCY MEDICINE
Payer: OTHER GOVERNMENT

## 2023-11-27 VITALS
SYSTOLIC BLOOD PRESSURE: 147 MMHG | WEIGHT: 180 LBS | DIASTOLIC BLOOD PRESSURE: 94 MMHG | HEIGHT: 67 IN | HEART RATE: 72 BPM | RESPIRATION RATE: 18 BRPM | OXYGEN SATURATION: 98 % | BODY MASS INDEX: 28.25 KG/M2 | TEMPERATURE: 98.3 F

## 2023-11-27 DIAGNOSIS — J40 BRONCHITIS: Primary | ICD-10-CM

## 2023-11-27 PROCEDURE — 99283 EMERGENCY DEPT VISIT LOW MDM: CPT

## 2023-11-27 RX ORDER — DOXYCYCLINE HYCLATE 100 MG
100 TABLET ORAL 2 TIMES DAILY
Qty: 20 TABLET | Refills: 0 | Status: SHIPPED | OUTPATIENT
Start: 2023-11-27 | End: 2023-12-07

## 2023-11-27 RX ORDER — ALBUTEROL SULFATE 90 UG/1
2 AEROSOL, METERED RESPIRATORY (INHALATION) EVERY 4 HOURS PRN
Qty: 1 EACH | Refills: 0 | Status: SHIPPED | OUTPATIENT
Start: 2023-11-27

## 2023-11-27 ASSESSMENT — PAIN - FUNCTIONAL ASSESSMENT: PAIN_FUNCTIONAL_ASSESSMENT: 0-10

## 2023-11-27 ASSESSMENT — ENCOUNTER SYMPTOMS
DIARRHEA: 0
COUGH: 1
SHORTNESS OF BREATH: 0
ABDOMINAL PAIN: 0
NAUSEA: 0
VOMITING: 0
RHINORRHEA: 1

## 2023-11-27 ASSESSMENT — PAIN DESCRIPTION - ORIENTATION: ORIENTATION: LEFT;RIGHT

## 2023-11-27 ASSESSMENT — PAIN DESCRIPTION - DESCRIPTORS: DESCRIPTORS: ACHING

## 2023-11-27 ASSESSMENT — PAIN DESCRIPTION - LOCATION: LOCATION: HAND

## 2023-11-27 ASSESSMENT — PAIN SCALES - GENERAL: PAINLEVEL_OUTOF10: 9

## 2023-11-27 NOTE — ED TRIAGE NOTES
Pt reports a few weeks of feeling fatigue, cough, congestion, weakness. Denies chest pain/shob. Started mucinex today.

## 2023-11-27 NOTE — DISCHARGE INSTRUCTIONS
Doxycycline twice daily for 10 days. Warm fluids, chicken soup and over-the-counter cough drops for cough. Albuterol 2 puffs with spacer chamber every 4 hours as needed for cough. Follow-up with your doctor in 7 to 10 days if not improving. Cough may persist for several weeks. Return for severe trouble breathing or any new or concerning symptoms.

## (undated) DEVICE — GOWN,REINFORCED,POLY,AURORA,XXLARGE,STR: Brand: MEDLINE

## (undated) DEVICE — BUR SHV CUT HLLW 6 FLUT 5.5MM --

## (undated) DEVICE — BLADE SHV CUT MENIS AGG + 4MM --

## (undated) DEVICE — NDL SPNE QNCKE 18GX3.5IN LF --

## (undated) DEVICE — MEDI-VAC NON-CONDUCTIVE SUCTION TUBING: Brand: CARDINAL HEALTH

## (undated) DEVICE — PACK,SHOULDER,DRAPE,POUCH: Brand: MEDLINE

## (undated) DEVICE — CARDINAL HEALTH FLEXIBLE LIGHT HANDLE COVER: Brand: CARDINAL HEALTH

## (undated) DEVICE — ABDOMINAL PAD: Brand: DERMACEA

## (undated) DEVICE — SURGICAL PROCEDURE PACK BASIC ST FRANCIS

## (undated) DEVICE — GUARDIAN LVC: Brand: GUARDIAN

## (undated) DEVICE — [RESECTOR CUTTER, ARTHROSCOPIC SHAVER BLADE,  DO NOT RESTERILIZE,  DO NOT USE IF PACKAGE IS DAMAGED,  KEEP DRY,  KEEP AWAY FROM SUNLIGHT]: Brand: FORMULA

## (undated) DEVICE — SLING ARM AD ULT

## (undated) DEVICE — OUTFLOW CASSETTE TUBING, DO NOT USE IF PACKAGE IS DAMAGED: Brand: CROSSFLOW

## (undated) DEVICE — SUTURE PROL SZ 2-0 L18IN NONABSORBABLE BLU FS L26MM 3/8 CIR 8685H

## (undated) DEVICE — SHOULDER SUSPENSION KIT 6 PER BOX

## (undated) DEVICE — (D)PREP SKN CHLRAPRP APPL 26ML -- CONVERT TO ITEM 371833

## (undated) DEVICE — SUTURE VCRL SZ 0 L27IN ABSRB UD L36MM CP-1 1/2 CIR REV CUT J267H

## (undated) DEVICE — SUTURE ETHBND 2 L30IN NONABSORBABLE GRN WHT LT GRN MO-7 D8793

## (undated) DEVICE — DRAPE TWL SURG 16X26IN BLU ORB04] ALLCARE INC]

## (undated) DEVICE — SOLUTION IRRIG 3000ML 0.9% SOD CHL FLX CONT 0797208] ICU MEDICAL INC]